# Patient Record
Sex: FEMALE | Race: WHITE | NOT HISPANIC OR LATINO | Employment: FULL TIME | URBAN - METROPOLITAN AREA
[De-identification: names, ages, dates, MRNs, and addresses within clinical notes are randomized per-mention and may not be internally consistent; named-entity substitution may affect disease eponyms.]

---

## 2019-01-15 ENCOUNTER — OFFICE VISIT (OUTPATIENT)
Dept: OBSTETRICS AND GYNECOLOGY | Facility: CLINIC | Age: 39
End: 2019-01-15
Attending: OBSTETRICS & GYNECOLOGY
Payer: COMMERCIAL

## 2019-01-15 VITALS
WEIGHT: 159.81 LBS | BODY MASS INDEX: 26.63 KG/M2 | HEIGHT: 65 IN | SYSTOLIC BLOOD PRESSURE: 140 MMHG | DIASTOLIC BLOOD PRESSURE: 100 MMHG

## 2019-01-15 DIAGNOSIS — N93.9 ABNORMAL UTERINE BLEEDING (AUB): Primary | ICD-10-CM

## 2019-01-15 DIAGNOSIS — Z31.9 PATIENT DESIRES PREGNANCY: ICD-10-CM

## 2019-01-15 PROCEDURE — 99999 PR PBB SHADOW E&M-NEW PATIENT-LVL III: ICD-10-PCS | Mod: PBBFAC,,, | Performed by: OBSTETRICS & GYNECOLOGY

## 2019-01-15 PROCEDURE — 3008F PR BODY MASS INDEX (BMI) DOCUMENTED: ICD-10-PCS | Mod: CPTII,S$GLB,, | Performed by: OBSTETRICS & GYNECOLOGY

## 2019-01-15 PROCEDURE — 3008F BODY MASS INDEX DOCD: CPT | Mod: CPTII,S$GLB,, | Performed by: OBSTETRICS & GYNECOLOGY

## 2019-01-15 PROCEDURE — 99203 PR OFFICE/OUTPT VISIT, NEW, LEVL III, 30-44 MIN: ICD-10-PCS | Mod: S$GLB,,, | Performed by: OBSTETRICS & GYNECOLOGY

## 2019-01-15 PROCEDURE — 99203 OFFICE O/P NEW LOW 30 MIN: CPT | Mod: S$GLB,,, | Performed by: OBSTETRICS & GYNECOLOGY

## 2019-01-15 PROCEDURE — 99999 PR PBB SHADOW E&M-NEW PATIENT-LVL III: CPT | Mod: PBBFAC,,, | Performed by: OBSTETRICS & GYNECOLOGY

## 2019-01-15 RX ORDER — UBIDECARENONE 30 MG
30 CAPSULE ORAL 3 TIMES DAILY
COMMUNITY

## 2019-01-15 RX ORDER — ZINC GLUCONATE 50 MG
50 TABLET ORAL DAILY
COMMUNITY

## 2019-01-15 NOTE — PROGRESS NOTES
CC: Abnormal bleeding, desires pregnancy    HPI:  Ruddy Saini is a 38 y.o. female patient  who presents today as a new patient to discuss her irregular bleeding and desire for pregnancy.  She has recently moved to [facility] from [place].  Previously, she had been on OCPs and then NuvaRing with light regular monthly menses.  Last year, she stopped the NuvaRing 2018 and developed persistent, prolonged bleeding for about 2 months until she reinserted another NuvaRing 10/2018.  For the past 2 months, she has had regular expected withdrawal bleeding with the NuvaRing.  She is currently on her period with moderate flow today.  She describes also having abnormal bleeding at other times when not using hormonal contraception.  Her mother has a history of uterine fibroids.  She is now the point that she is considering pregnancy.  Reports that her last pap was about 2 years ago in [place] - denies having abnormal paps in the past.    Patient's last menstrual period was 2018 (exact date).    History reviewed. No pertinent past medical history.    History reviewed. No pertinent surgical history.      Diagnosis:  1. Abnormal uterine bleeding (AUB)    2. Patient desires pregnancy          PLAN:    Orders Placed This Encounter    US Pelvis Comp with Transvag NON-OB (xpd    Follicle stimulating hormone    Luteinizing hormone    Prolactin    TSH    Antimullerian hormone (AMH)       Patient was counseled today on her abnormal bleeding which occurred several months ago while not on hormonal contraception.  We reviewed the various etiologies for this abnormal bleeding. She will begin the workup with pelvic ultrasound for evaluation of her uterus.  We also discussed checking hormone levels on cycle day 3 with her next period.  We discussed her desire for pregnancy at length, including AMA - risks.  We reviewed various genetic testing options.  We discussed strategies to maximize conception.  She  was encouraged to stop smoking.  To begin PNV.  She was encouraged to become established with a PCP for evaluation of her BP.    Follow-up after ultrasound / labs for evaluation / annual exam.    Total time of visit / counselin mintues

## 2019-01-23 ENCOUNTER — HOSPITAL ENCOUNTER (OUTPATIENT)
Dept: RADIOLOGY | Facility: OTHER | Age: 39
Discharge: HOME | End: 2019-01-23
Attending: OBSTETRICS & GYNECOLOGY
Payer: COMMERCIAL

## 2019-01-23 ENCOUNTER — TELEPHONE (OUTPATIENT)
Dept: OBSTETRICS AND GYNECOLOGY | Facility: CLINIC | Age: 39
End: 2019-01-23

## 2019-01-23 DIAGNOSIS — N93.9 ABNORMAL UTERINE BLEEDING (AUB): ICD-10-CM

## 2019-01-23 PROCEDURE — 76830 TRANSVAGINAL US NON-OB: CPT | Mod: TC

## 2019-01-23 PROCEDURE — 76830 TRANSVAGINAL US NON-OB: CPT | Mod: 26,,, | Performed by: RADIOLOGY

## 2019-01-23 PROCEDURE — 76830 US PELVIS COMP WITH TRANSVAG NON-OB (XPD): ICD-10-PCS | Mod: 26,,, | Performed by: RADIOLOGY

## 2019-01-23 PROCEDURE — 76856 US EXAM PELVIC COMPLETE: CPT | Mod: 26,,, | Performed by: RADIOLOGY

## 2019-01-23 PROCEDURE — 76856 US PELVIS COMP WITH TRANSVAG NON-OB (XPD): ICD-10-PCS | Mod: 26,,, | Performed by: RADIOLOGY

## 2019-01-24 NOTE — TELEPHONE ENCOUNTER
----- Message from Barb Madrigal sent at 1/24/2019  2:31 PM CST -----  Contact: self  Pt is returning a phone call. The pt can be reached at [phone].

## 2019-01-25 ENCOUNTER — TELEPHONE (OUTPATIENT)
Dept: OBSTETRICS AND GYNECOLOGY | Facility: CLINIC | Age: 39
End: 2019-01-25

## 2019-01-25 NOTE — TELEPHONE ENCOUNTER
----- Message from Trae Linares sent at 1/25/2019 12:30 PM CST -----  Contact: RUDDY MORAN [[id number]]  Name of Who is Calling: RUDDY MORAN [[id number]]      What is the request in detail: Returning Dr. Yousif call.       Can the clinic reply by [facility]: no      [facility]: [phone]

## 2019-01-25 NOTE — TELEPHONE ENCOUNTER
Called patient:    Discussed results of pelvic sono:    FINDINGS:  Uterus:  Size: 7.9 x 4.4 x 5.4 cm  Masses: 2 small uterine fibroids measuring 1.5 x 1.0 x 1.5 cm and 1.1 x 0.9 x 1.3 cm.  Endometrium: Normal in this pre menopausal patient, measuring 7 mm.  Right ovary:  Size: 2.0 x 1.4 x 2.2 cm  Appearance: Normal  Vascular flow: Normal.  Left ovary:  Size: 3.6 x 2.4 x 2.4 cm  Appearance: Normal  Vascular Flow: Normal.  Free Fluid:  None.      Impression     Two small uterine fibroids.  Otherwise no significant sonographic abnormality.     We reviewed the ultrasound findings and discussed fibroids.  1.5 cm fibroids are so small that I do not think they are the etiology for her prior abnormal bleeding. She will contact us with the beginning of her menses to schedule labs to evaluate her hormones.

## 2019-02-07 ENCOUNTER — TELEPHONE (OUTPATIENT)
Dept: OBSTETRICS AND GYNECOLOGY | Facility: CLINIC | Age: 39
End: 2019-02-07

## 2019-02-07 NOTE — TELEPHONE ENCOUNTER
Message   Received: Today   Message Contents   JASPAL Echavarria Staff   Caller: Unspecified (Today, 10:12 AM)             Pt call and want to schedule her check up and lab. Pt states she was told to call and ask for nurse to set up her visit. Pt can be reached at [phone]. Thanks

## 2019-02-07 NOTE — TELEPHONE ENCOUNTER
----- Message from Matilda Ahumada MA sent at 2/7/2019 10:12 AM CST -----  Pt call and want to schedule her check up and lab. Pt states she was told to call and ask for nurse to set up her visit. Pt can be reached at [phone]. Thanks

## 2019-02-11 ENCOUNTER — LAB VISIT (OUTPATIENT)
Dept: LAB | Facility: HOSPITAL | Age: 39
End: 2019-02-11
Attending: OBSTETRICS & GYNECOLOGY
Payer: COMMERCIAL

## 2019-02-11 DIAGNOSIS — N93.9 ABNORMAL UTERINE BLEEDING (AUB): ICD-10-CM

## 2019-02-11 LAB
FSH SERPL-ACNC: 24 MIU/ML
LH SERPL-ACNC: 11.1 MIU/ML
PROLACTIN SERPL IA-MCNC: 28.1 NG/ML
TSH SERPL DL<=0.005 MIU/L-ACNC: 1.5 UIU/ML

## 2019-02-11 PROCEDURE — 83520 IMMUNOASSAY QUANT NOS NONAB: CPT

## 2019-02-11 PROCEDURE — 84443 ASSAY THYROID STIM HORMONE: CPT

## 2019-02-11 PROCEDURE — 36415 COLL VENOUS BLD VENIPUNCTURE: CPT

## 2019-02-11 PROCEDURE — 84146 ASSAY OF PROLACTIN: CPT

## 2019-02-11 PROCEDURE — 83001 ASSAY OF GONADOTROPIN (FSH): CPT

## 2019-02-11 PROCEDURE — 83002 ASSAY OF GONADOTROPIN (LH): CPT

## 2019-02-12 ENCOUNTER — TELEPHONE (OUTPATIENT)
Dept: OBSTETRICS AND GYNECOLOGY | Facility: CLINIC | Age: 39
End: 2019-02-12

## 2019-02-12 LAB — MIS SERPL-MCNC: <0.1 NG/ML (ref 0.9–9.5)

## 2019-02-12 NOTE — TELEPHONE ENCOUNTER
Called patient:    Message left to call us.    [FSH upper limits of normal, prolactin elevated - to see endocrinology]

## 2019-02-13 NOTE — TELEPHONE ENCOUNTER
----- Message from Tete Herrera sent at 2/13/2019  1:01 PM CST -----  Contact: alexandre  Name of Who is Calling: alexandre      What is the request in detail: Patient was returning a missed call back from the staff       Can the clinic reply by [facility]: no      [facility]: [phone]

## 2019-02-14 NOTE — TELEPHONE ENCOUNTER
Called patient:    Discussed results of labs:    TSH 1.4,  Prolactin 28.1  FSH 24,  LH 11.1  AMH <.1    We discussed her elevated FSH/LH as well as decreased AMH / ovarian reserve and mildly elevated prolactin.    With her desire for pregnancy and these hormone findings, we discussed the recommendation for DEXTER - given names.

## 2019-08-20 ENCOUNTER — PATIENT MESSAGE (OUTPATIENT)
Dept: OBSTETRICS AND GYNECOLOGY | Facility: CLINIC | Age: 39
End: 2019-08-20

## 2019-08-20 ENCOUNTER — OFFICE VISIT (OUTPATIENT)
Dept: INTERNAL MEDICINE | Facility: CLINIC | Age: 39
End: 2019-08-20
Payer: COMMERCIAL

## 2019-08-20 VITALS
SYSTOLIC BLOOD PRESSURE: 116 MMHG | OXYGEN SATURATION: 99 % | DIASTOLIC BLOOD PRESSURE: 82 MMHG | HEART RATE: 81 BPM | HEIGHT: 65 IN | WEIGHT: 156.63 LBS | BODY MASS INDEX: 26.1 KG/M2

## 2019-08-20 DIAGNOSIS — N94.6 DYSMENORRHEA: ICD-10-CM

## 2019-08-20 DIAGNOSIS — E55.9 VITAMIN D DEFICIENCY: ICD-10-CM

## 2019-08-20 DIAGNOSIS — M79.10 MYALGIA: ICD-10-CM

## 2019-08-20 DIAGNOSIS — Z13.1 SCREENING FOR DIABETES MELLITUS: ICD-10-CM

## 2019-08-20 DIAGNOSIS — Z13.220 SCREENING FOR LIPID DISORDERS: ICD-10-CM

## 2019-08-20 DIAGNOSIS — Z00.00 ANNUAL PHYSICAL EXAM: Primary | ICD-10-CM

## 2019-08-20 PROBLEM — N20.0 RECURRENT NEPHROLITHIASIS: Status: ACTIVE | Noted: 2019-08-20

## 2019-08-20 PROCEDURE — 99385 PR PREVENTIVE VISIT,NEW,18-39: ICD-10-PCS | Mod: S$GLB,,, | Performed by: FAMILY MEDICINE

## 2019-08-20 PROCEDURE — 99385 PREV VISIT NEW AGE 18-39: CPT | Mod: S$GLB,,, | Performed by: FAMILY MEDICINE

## 2019-08-20 PROCEDURE — 99999 PR PBB SHADOW E&M-EST. PATIENT-LVL IV: ICD-10-PCS | Mod: PBBFAC,,, | Performed by: FAMILY MEDICINE

## 2019-08-20 PROCEDURE — 99999 PR PBB SHADOW E&M-EST. PATIENT-LVL IV: CPT | Mod: PBBFAC,,, | Performed by: FAMILY MEDICINE

## 2019-08-20 RX ORDER — METAXALONE 800 MG/1
800 TABLET ORAL 3 TIMES DAILY PRN
Qty: 30 TABLET | Refills: 1 | Status: SHIPPED | OUTPATIENT
Start: 2019-08-20

## 2019-08-20 RX ORDER — AMOXICILLIN 500 MG
CAPSULE ORAL DAILY
COMMUNITY

## 2019-08-20 RX ORDER — METAXALONE 800 MG/1
800 TABLET ORAL 3 TIMES DAILY PRN
COMMUNITY
End: 2019-08-20 | Stop reason: SDUPTHER

## 2019-08-20 NOTE — PATIENT INSTRUCTIONS
Prevention Guidelines, Women Ages 18 to 39  Screening tests and vaccines are an important part of managing your health. Health counseling is essential, too. Below are guidelines for these, for women ages 18 to 39. Talk with your healthcare provider to make sure youre up-to-date on what you need.  Screening Who needs it How often   Alcohol misuse All women in this age group At routine exams   Blood pressure All women in this age group Every 2 years if your blood pressure is less than 120/80 mm Hg; yearly if your systolic blood pressure is 120 to 139 mm Hg, or your diastolic blood pressure reading is 80 to 89 mm Hg   Breast cancer All women in this age group should talk with their healthcare providers about the need for clinical breast exams (CBE)1 Clinical breast exam every 3 years1   Cervical cancer Women ages 21 and older Women between ages 21 and 29 should have a Pap test every 3 years; women between ages 30 and 65 are advised to have a Pap test plus an HPV test every 5 years   Chlamydia Sexually active women ages 24 and younger, and women at increased risk for infection Every 3 years if you're at risk or have symptoms   Depression All women in this age group At routine exams   Diabetes mellitus, type 2 Adults with no symptoms who are overweight or obese and have 1 or more other risk factors for diabetes At least every 3 years   Gonorrhea Sexually active women at increased risk for infection At routine exams   Hepatitis C Anyone at increased risk At routine exams   HIV All women At routine exams   Obesity All women in this age group At routine exams   Syphilis Women at increased risk for infection - talk with your healthcare provider At routine exams   Tuberculosis Women at increased risk for infection - talk with your healthcare provider Ask your healthcare provider   Vision All women in this age group At least 1 complete exam in your 20s, and 2 in your 30s   Vaccine2 Who needs it How often   Chickenpox  (varicella) All women in this age group who have no record of this infection or vaccine 2 doses; the second dose should be given 4 to 8 weeks after the first dose   Hepatitis A Women at increased risk for infection - talk with your healthcare provider 2 doses given at least 6 months apart   Hepatitis B Women at increased risk for infection - talk with your healthcare provider 3 doses over 6 months; second dose should be given 1 month after the first dose; the third dose should be given at least 2 months after the second dose and at least 4 months after the first dose   Haemophilus influenzae Type B (HIB) Women at increased risk for infection - talk with your healthcare provider 1 to 3 doses   Human papillomavirus (HPV) All women in this age group up to age 26 3 doses; the second dose should be given 1 to 2 months after the first dose and the third dose given 6 months after the first dose   Influenza (flu) All women in this age group Once a year   Measles, mumps, rubella (MMR) All women in this age group who have no record of these infections or vaccines 1 or 2 doses   Meningococcal Women at increased risk for infection - talk with your healthcare provider 1 or more doses   Pneumococcal conjugate vaccine (PCV13) and pneumococcal polysaccharide vaccine (PPSV23) Women at increased risk for infection - talk with your healthcare provider PCV13: 1 dose ages 19 to 65 (protects against 13 types of pneumococcal bacteria)  PPSV23: 1 to 2 doses through age 64, or 1 dose at 65 or older (protects against 23 types of pneumococcal bacteria)      Tetanus/diphtheria/pertussis (Td/Tdap) booster All women in this age group Td every 10 years, or a one-time dose of Tdap instead of a Td booster after age 18, then Td every 10 years   Counseling Who needs it How often   BRCA gene mutation testing for breast and ovarian cancer susceptibility Women with increased risk for having gene mutation When your risk is known   Breast cancer and  chemoprevention Women at high risk for breast cancer When your risk is known   Diet and exercise Women who are overweight or obese When diagnosed, and then at routine exams   Domestic violence Women at the age in which they are able to have children At routine exams   Sexually transmitted infection prevention Women who are sexually active At routine exams   Skin cancer Prevention of skin cancer in fair-skinned adults through age 24 At routine exams   Use of tobacco and the health effects it can cause All women in this age group Every visit   1According to the ACS, women ages 20 to 39 years should have a clinical breast exam (CBE) as part of their routine health exam every 3 years. Breast self-exams are an option for women starting in their 20s. But the  USPSTF does not recommend CBE.  2Those who are 18 years old and not up-to-date on their childhood vaccines should get all appropriate catch-up vaccines recommended by the CDC.  Date Last Reviewed: 8/27/2015  © 7417-2674 [employer]. [address] [place] [address], [place] [address]. All rights reserved. This information is not intended as a substitute for professional medical care. Always follow your healthcare professional's instructions.

## 2019-08-20 NOTE — PROGRESS NOTES
[facility]  [facility]  Clinic Note      Subjective:       Patient ID: Ruddy Saini is a 39 y.o. female.    Chief Complaint: Back Pain    New patient is here today to establish care and for wellness exam.  She notes 4-5 days onset of headache, migraine versus tension, and diffuse muscle pains, cramping and tension.  She notes the pain was initially 7-8/10 pain, now down to 2/10 after taking Excedrin and ibuprofen.  She notes prior episodes of lower back pain, but this was different, more diffuse, more like cramping.  In the past she had been taking Skelaxin for back and muscle pain, but had not tried it during this episode.  She describes possible flu in July, with episodic nasal symptoms, also possibly related to seasonal allergies and patient is not sure.  She has not had any fever or sick contacts.  She describes the pain and muscle spasms in the lower back muscles, as well as bilateral hamstrings, calves, elbows, and forearms.  No radicular pain or radiation, no paresthesias.  She has had some neck muscle tension, which is more chronic/recurrent.  She describes some salivary gland enlargement and spasms in this timeframe, and the overlying fatigue.  Otherwise without any concerns or complaints today.  Cervical cancer screening was completed last year, is now followed by Dr. Yousif since moving from [place] last summer.  She has no breast concerns, no prior need for mammogram.  She is a .    Past medical, surgical, family, social history reviewed.      Review of Systems   Constitutional: Positive for fatigue. Negative for fever.   HENT: Positive for rhinorrhea and sinus pain. Negative for congestion, sinus pressure and sore throat.    Respiratory: Negative for cough and shortness of breath.    Cardiovascular: Negative for chest pain and palpitations.   Gastrointestinal: Negative for diarrhea, nausea and vomiting.   Genitourinary: Positive  "for menstrual problem (irregular cycles, is trying to become pregnant). Negative for dysuria.   Musculoskeletal: Positive for back pain, myalgias and neck pain. Negative for arthralgias and neck stiffness.   Skin: Negative for rash and wound.   Neurological: Negative for dizziness, syncope, weakness and headaches.   Psychiatric/Behavioral: Negative for sleep disturbance (except as due to pain). The patient is not nervous/anxious.        Objective:      /82 (BP Location: Right arm, Patient Position: Sitting, BP Method: Large (Manual))   Pulse 81   Ht 5' 5" (1.651 m)   Wt 71.1 kg (156 lb 10.2 oz)   LMP 07/01/2019 (Approximate)   SpO2 99%   BMI 26.07 kg/m²   Physical Exam   Constitutional: She is oriented to person, place, and time. She appears well-developed and well-nourished. No distress.   HENT:   Head: Normocephalic and atraumatic.   Right Ear: Tympanic membrane and ear canal normal. Tympanic membrane is not erythematous and not retracted. No middle ear effusion.   Left Ear: Tympanic membrane and ear canal normal. Tympanic membrane is not erythematous and not retracted.  No middle ear effusion.   Nose: Rhinorrhea present. No mucosal edema. Right sinus exhibits no maxillary sinus tenderness and no frontal sinus tenderness. Left sinus exhibits no maxillary sinus tenderness and no frontal sinus tenderness.   Mouth/Throat: Mucous membranes are normal. Posterior oropharyngeal erythema (mild post-nasal drip) present. No posterior oropharyngeal edema.   Neck: Normal range of motion. No thyromegaly present.   Cardiovascular: Normal rate and regular rhythm.   No murmur heard.  Pulmonary/Chest: Effort normal and breath sounds normal. No respiratory distress. She has no wheezes. She has no rales.   Abdominal: Soft. Bowel sounds are normal. She exhibits no distension. There is no tenderness.   Musculoskeletal: Normal range of motion. She exhibits no edema.        Cervical back: She exhibits pain and spasm. She " exhibits normal range of motion, no tenderness, no bony tenderness and no deformity.        Lumbar back: She exhibits pain and spasm. She exhibits normal range of motion, no tenderness, no bony tenderness and no deformity.   Lymphadenopathy:     She has cervical adenopathy (mild submandibular, mild tender).   Neurological: She is alert and oriented to person, place, and time. No cranial nerve deficit or sensory deficit. She exhibits normal muscle tone.   Skin: Skin is warm and dry. No rash noted.   Psychiatric: She has a normal mood and affect.   Vitals reviewed.      Assessment:       1. Annual physical exam    2. Screening for lipid disorders    3. Screening for diabetes mellitus    4. Dysmenorrhea    5. Vitamin D deficiency    6. Myalgia        Plan:     1. Annual physical exam  2. Screening for lipid disorders  3. Screening for diabetes mellitus  4. Dysmenorrhea  5. Vitamin D deficiency  - health history reviewed, USPSTF preventive health recommendations based on age reviewed and tests ordered below, will request records from [place] to update health maintenance information  - - Lipid panel; Future  - - Hemoglobin A1c; Future  - - Comprehensive metabolic panel; Future  - - CBC auto differential; Future  - routine health maintenance counseling provided and healthy diet/exercise recommendations reviewed  - adult immunization counseling reviewed     6. Myalgia  - history and exam findings reviewed, generally reassuring and clinical course has improved in the last 2-3 days  - differential reviewed, presentation is most suggestive of viral syndrome given constellation of symptoms and diffuse nature, no alarm signs or symptoms  - supportive care measures reviewed, topical remedies and trial of physical therapy recommended for the recurrent neck/back and muscle pain, patient is most interested in dry needling as an option as well  - - Ambulatory Referral to Physical/Occupational Therapy   - treatment options  reviewed, would be reasonable to continue OTC NSAID use judiciously, will offer refill of muscle relaxant for intermittent as-needed use, dosing instructions and potential side effects reviewed  - - metaxalone (SKELAXIN) 800 MG tablet; Take 1 tablet (800 mg total) by mouth 3 (three) times daily as needed for Pain.  Dispense: 30 tablet; Refill: 1  - questions answered, warning signs reviewed, patient is comfortable with this plan and was encouraged to call the office for any concerns or worsening of condition     - Follow up in about 1 year (around 8/20/2020) for annual exam.     Trey Read MD  8/20/2019          Medication List with Changes/Refills   Current Medications    CO-ENZYME Q-10 30 MG CAPSULE    Take 30 mg by mouth 3 (three) times daily.    FISH OIL-OMEGA-3 FATTY ACIDS 300-1,000 MG CAPSULE    Take by mouth once daily.    ZINC GLUCONATE 50 MG TABLET    Take 50 mg by mouth once daily.   Changed and/or Refilled Medications    Modified Medication Previous Medication    METAXALONE (SKELAXIN) 800 MG TABLET metaxalone (SKELAXIN) 800 MG tablet       Take 1 tablet (800 mg total) by mouth 3 (three) times daily as needed for Pain.    Take 800 mg by mouth 3 (three) times daily as needed for Pain.

## 2019-08-21 ENCOUNTER — LAB VISIT (OUTPATIENT)
Dept: LAB | Facility: HOSPITAL | Age: 39
End: 2019-08-21
Attending: FAMILY MEDICINE
Payer: COMMERCIAL

## 2019-08-21 DIAGNOSIS — E55.9 VITAMIN D DEFICIENCY: ICD-10-CM

## 2019-08-21 DIAGNOSIS — Z00.00 ANNUAL PHYSICAL EXAM: ICD-10-CM

## 2019-08-21 DIAGNOSIS — N94.6 DYSMENORRHEA: ICD-10-CM

## 2019-08-21 DIAGNOSIS — Z13.1 SCREENING FOR DIABETES MELLITUS: ICD-10-CM

## 2019-08-21 DIAGNOSIS — Z13.220 SCREENING FOR LIPID DISORDERS: ICD-10-CM

## 2019-08-21 LAB
25(OH)D3+25(OH)D2 SERPL-MCNC: 32 NG/ML (ref 30–96)
ALBUMIN SERPL BCP-MCNC: 4.3 G/DL (ref 3.5–5.2)
ALP SERPL-CCNC: 45 U/L (ref 55–135)
ALT SERPL W/O P-5'-P-CCNC: 12 U/L (ref 10–44)
ANION GAP SERPL CALC-SCNC: 8 MMOL/L (ref 8–16)
AST SERPL-CCNC: 11 U/L (ref 10–40)
BASOPHILS # BLD AUTO: 0.02 K/UL (ref 0–0.2)
BASOPHILS NFR BLD: 0.4 % (ref 0–1.9)
BILIRUB SERPL-MCNC: 0.7 MG/DL (ref 0.1–1)
BUN SERPL-MCNC: 13 MG/DL (ref 6–20)
CALCIUM SERPL-MCNC: 10 MG/DL (ref 8.7–10.5)
CHLORIDE SERPL-SCNC: 103 MMOL/L (ref 95–110)
CHOLEST SERPL-MCNC: 126 MG/DL (ref 120–199)
CHOLEST/HDLC SERPL: 2.3 {RATIO} (ref 2–5)
CO2 SERPL-SCNC: 27 MMOL/L (ref 23–29)
CREAT SERPL-MCNC: 0.8 MG/DL (ref 0.5–1.4)
DIFFERENTIAL METHOD: ABNORMAL
EOSINOPHIL # BLD AUTO: 0.1 K/UL (ref 0–0.5)
EOSINOPHIL NFR BLD: 3 % (ref 0–8)
ERYTHROCYTE [DISTWIDTH] IN BLOOD BY AUTOMATED COUNT: 12 % (ref 11.5–14.5)
EST. GFR  (AFRICAN AMERICAN): >60 ML/MIN/1.73 M^2
EST. GFR  (NON AFRICAN AMERICAN): >60 ML/MIN/1.73 M^2
ESTIMATED AVG GLUCOSE: 100 MG/DL (ref 68–131)
GLUCOSE SERPL-MCNC: 97 MG/DL (ref 70–110)
HBA1C MFR BLD HPLC: 5.1 % (ref 4–5.6)
HCT VFR BLD AUTO: 43.4 % (ref 37–48.5)
HDLC SERPL-MCNC: 56 MG/DL (ref 40–75)
HDLC SERPL: 44.4 % (ref 20–50)
HGB BLD-MCNC: 13.7 G/DL (ref 12–16)
IMM GRANULOCYTES # BLD AUTO: 0.01 K/UL (ref 0–0.04)
IMM GRANULOCYTES NFR BLD AUTO: 0.2 % (ref 0–0.5)
LDLC SERPL CALC-MCNC: 58.4 MG/DL (ref 63–159)
LYMPHOCYTES # BLD AUTO: 0.9 K/UL (ref 1–4.8)
LYMPHOCYTES NFR BLD: 19.1 % (ref 18–48)
MCH RBC QN AUTO: 30.9 PG (ref 27–31)
MCHC RBC AUTO-ENTMCNC: 31.6 G/DL (ref 32–36)
MCV RBC AUTO: 98 FL (ref 82–98)
MONOCYTES # BLD AUTO: 0.5 K/UL (ref 0.3–1)
MONOCYTES NFR BLD: 10.5 % (ref 4–15)
NEUTROPHILS # BLD AUTO: 3.1 K/UL (ref 1.8–7.7)
NEUTROPHILS NFR BLD: 66.8 % (ref 38–73)
NONHDLC SERPL-MCNC: 70 MG/DL
NRBC BLD-RTO: 0 /100 WBC
PLATELET # BLD AUTO: 227 K/UL (ref 150–350)
PMV BLD AUTO: 11.5 FL (ref 9.2–12.9)
POTASSIUM SERPL-SCNC: 4.8 MMOL/L (ref 3.5–5.1)
PROT SERPL-MCNC: 8.2 G/DL (ref 6–8.4)
RBC # BLD AUTO: 4.43 M/UL (ref 4–5.4)
SODIUM SERPL-SCNC: 138 MMOL/L (ref 136–145)
TRIGL SERPL-MCNC: 58 MG/DL (ref 30–150)
WBC # BLD AUTO: 4.66 K/UL (ref 3.9–12.7)

## 2019-08-21 PROCEDURE — 36415 COLL VENOUS BLD VENIPUNCTURE: CPT | Mod: PN

## 2019-08-21 PROCEDURE — 80053 COMPREHEN METABOLIC PANEL: CPT

## 2019-08-21 PROCEDURE — 80061 LIPID PANEL: CPT

## 2019-08-21 PROCEDURE — 83036 HEMOGLOBIN GLYCOSYLATED A1C: CPT

## 2019-08-21 PROCEDURE — 82306 VITAMIN D 25 HYDROXY: CPT

## 2019-08-21 PROCEDURE — 85025 COMPLETE CBC W/AUTO DIFF WBC: CPT

## 2019-08-22 ENCOUNTER — TELEPHONE (OUTPATIENT)
Dept: INTERNAL MEDICINE | Facility: CLINIC | Age: 39
End: 2019-08-22

## 2019-08-22 NOTE — TELEPHONE ENCOUNTER
"MyChart message sent.  Please let the patient know their results, thank you:    " Hi Ms. Saini,    I have reviewed your recent lab results, and things are looking good.  Your blood counts are all within acceptable limits and do not show any anemia.  Your vitamin D level is in the normal range.  Your electrolytes, blood sugar, and kidney/liver function are all normal.  Your screening for diabetes is negative, and your cholesterol levels are all excellent.  Keep up the good work!  Please call the office if you have any additional questions or concerns.    Trey Read MD      "

## 2019-08-28 ENCOUNTER — OFFICE VISIT (OUTPATIENT)
Dept: OBSTETRICS AND GYNECOLOGY | Facility: CLINIC | Age: 39
End: 2019-08-28
Attending: OBSTETRICS & GYNECOLOGY
Payer: COMMERCIAL

## 2019-08-28 ENCOUNTER — TELEPHONE (OUTPATIENT)
Dept: INTERNAL MEDICINE | Facility: CLINIC | Age: 39
End: 2019-08-28

## 2019-08-28 VITALS
BODY MASS INDEX: 26.19 KG/M2 | SYSTOLIC BLOOD PRESSURE: 134 MMHG | WEIGHT: 157.44 LBS | DIASTOLIC BLOOD PRESSURE: 84 MMHG

## 2019-08-28 DIAGNOSIS — N92.6 IRREGULAR MENSES: ICD-10-CM

## 2019-08-28 DIAGNOSIS — Z01.419 WELL WOMAN EXAM WITH ROUTINE GYNECOLOGICAL EXAM: Primary | ICD-10-CM

## 2019-08-28 DIAGNOSIS — Z12.4 PAP SMEAR FOR CERVICAL CANCER SCREENING: ICD-10-CM

## 2019-08-28 PROCEDURE — 87624 HPV HI-RISK TYP POOLED RSLT: CPT

## 2019-08-28 PROCEDURE — 99395 PR PREVENTIVE VISIT,EST,18-39: ICD-10-PCS | Mod: S$GLB,,, | Performed by: OBSTETRICS & GYNECOLOGY

## 2019-08-28 PROCEDURE — 99999 PR PBB SHADOW E&M-EST. PATIENT-LVL II: CPT | Mod: PBBFAC,,, | Performed by: OBSTETRICS & GYNECOLOGY

## 2019-08-28 PROCEDURE — 99395 PREV VISIT EST AGE 18-39: CPT | Mod: S$GLB,,, | Performed by: OBSTETRICS & GYNECOLOGY

## 2019-08-28 PROCEDURE — 99999 PR PBB SHADOW E&M-EST. PATIENT-LVL II: ICD-10-PCS | Mod: PBBFAC,,, | Performed by: OBSTETRICS & GYNECOLOGY

## 2019-08-28 PROCEDURE — 88175 CYTOPATH C/V AUTO FLUID REDO: CPT

## 2019-08-28 NOTE — PROGRESS NOTES
Ruddy Saini is a 39 y.o. female  who presents for annual exam.  She has been off of hormonal contraception since 2019, attempting conception.  For the first four months of this year, she had regular monthly menses, lasting 5-7 days.  However, she did not have a period May or .  In July, ovulation predictor kits were consistent with ovulation and she noted some spotting.  Recently, she has had additional spotting and feels that her period just started today.    Patient's last menstrual period was 2019 (approximate).     UPT: Negative      Pelvic sono 19  FINDINGS:  Uterus:  Size: 7.9 x 4.4 x 5.4 cm  Masses: 2 small uterine fibroids measuring 1.5 x 1.0 x 1.5 cm and 1.1 x 0.9 x 1.3 cm.  Endometrium: Normal in this pre menopausal patient, measuring 7 mm.  Right ovary:  Size: 2.0 x 1.4 x 2.2 cm  Appearance: Normal  Vascular flow: Normal.  Left ovary:  Size: 3.6 x 2.4 x 2.4 cm  Appearance: Normal  Vascular Flow: Normal.  Free Fluid:  None.       Impression       Two small uterine fibroids.  Otherwise no significant sonographic abnormality.       19  TSH 1.4,  Prolactin 28.1  FSH 24,  LH 11.1  AMH <.1    Past Medical History:   Diagnosis Date    Recurrent nephrolithiasis 2019       History reviewed. No pertinent surgical history.    OB History        1    Para        Term                AB   1    Living   0       SAB        TAB        Ectopic        Multiple        Live Births                     ROS:  GENERAL: Feeling well overall.   SKIN: Denies rash or lesions.   HEAD: Denies head injury or headache.   NODES: Denies enlarged lymph nodes.   CHEST: Denies chest pain or shortness of breath.   CARDIOVASCULAR: Denies palpitations or left sided chest pain.   ABDOMEN: No abdominal pain, nausea, vomiting or rectal bleeding.   URINARY: No dysuria or hematuria.  REPRODUCTIVE: See HPI.   BREASTS: Denies pain, lumps, or nipple discharge.   HEMATOLOGIC: No easy bruisability  or excessive bleeding.   MUSCULOSKELETAL: Denies joint pain or swelling.   NEUROLOGIC: Denies syncope or weakness.   PSYCHIATRIC: Denies depression.    PE:   (chaperone present during entire exam)  APPEARANCE: Well nourished, well developed, in no acute distress.  BREASTS: Symmetrical, no skin changes or visible lesions. No palpable masses, nipple discharge or adenopathy bilaterally.  ABDOMEN: Soft. No tenderness or masses. No hernias. No CVA tenderness.  VULVA: No lesions. Normal female genitalia.  URETHRAL MEATUS: Normal size and location, no lesions, no prolapse.  URETHRA: No masses, tenderness, prolapse or scarring.  VAGINA: Moist and well rugated, mild amount of menstrual blood in vault.  CERVIX: No lesions and discharge. PAP done.  UTERUS: Normal size, regular shape, mobile, non-tender, bladder base nontender.  ADNEXA: No masses, tenderness or CDS nodularity.  ANUS PERINEUM: Normal.      Diagnosis:  1. Well woman exam with routine gynecological exam    2. Pap smear for cervical cancer screening    3. Irregular menses          PLAN:    Orders Placed This Encounter    HPV High Risk Genotypes, PCR    Liquid-based pap smear, screening       Patient was counseled today on the need for annual gyn exams.  We discussed her recent abnormal bleeding and the various etiologies.  With her desire for pregnancy at age 39 with her irregular menses, and above hormone levels, we discussed the recommendation for referral to DEXTER - kimmy names.      Follow-up in 1 year.

## 2019-08-29 NOTE — TELEPHONE ENCOUNTER
Records reviewed, health maintenance record updated, please send records for scanning into chart, thank you.

## 2019-09-04 LAB
HPV HR 12 DNA CVX QL NAA+PROBE: NEGATIVE
HPV16 AG SPEC QL: NEGATIVE
HPV18 DNA SPEC QL NAA+PROBE: NEGATIVE

## 2019-09-05 ENCOUNTER — TELEPHONE (OUTPATIENT)
Dept: INTERNAL MEDICINE | Facility: CLINIC | Age: 39
End: 2019-09-05

## 2019-09-05 ENCOUNTER — PATIENT MESSAGE (OUTPATIENT)
Dept: OBSTETRICS AND GYNECOLOGY | Facility: CLINIC | Age: 39
End: 2019-09-05

## 2019-09-16 ENCOUNTER — CLINICAL SUPPORT (OUTPATIENT)
Dept: REHABILITATION | Facility: OTHER | Age: 39
End: 2019-09-16
Attending: FAMILY MEDICINE
Payer: COMMERCIAL

## 2019-09-16 DIAGNOSIS — M54.9 CHRONIC NECK AND BACK PAIN: Primary | ICD-10-CM

## 2019-09-16 DIAGNOSIS — M62.81 MUSCLE WEAKNESS OF UPPER EXTREMITY: ICD-10-CM

## 2019-09-16 DIAGNOSIS — G89.29 CHRONIC NECK AND BACK PAIN: Primary | ICD-10-CM

## 2019-09-16 DIAGNOSIS — M54.2 CHRONIC NECK AND BACK PAIN: Primary | ICD-10-CM

## 2019-09-16 DIAGNOSIS — M54.50 CHRONIC BILATERAL LOW BACK PAIN WITHOUT SCIATICA: ICD-10-CM

## 2019-09-16 DIAGNOSIS — G89.29 CHRONIC BILATERAL LOW BACK PAIN WITHOUT SCIATICA: ICD-10-CM

## 2019-09-16 DIAGNOSIS — R29.3 POOR POSTURE: ICD-10-CM

## 2019-09-16 PROCEDURE — 97161 PT EVAL LOW COMPLEX 20 MIN: CPT | Mod: PN

## 2019-09-16 PROCEDURE — 97140 MANUAL THERAPY 1/> REGIONS: CPT | Mod: PN

## 2019-09-16 NOTE — PLAN OF CARE
"[facility]  Physical Therapy Initial Evaluation    Name: Ruddy Saini  Clinic Number: [id number]    Therapy Diagnosis:   Encounter Diagnoses   Name Primary?    Chronic neck and back pain Yes    Chronic bilateral low back pain without sciatica     Poor posture     Muscle weakness of upper extremity      Physician: Trey Read MD    Physician Orders: PT Eval and Treat dry needling  Medical Diagnosis: M79.10 (ICD-10-CM) - Myalgia  Evaluation Date: 9/16/2019  Authorization Period Expiration: 8/19/2020  Plan of Care Certification Period: 11/11/19  Visit # / Visits authorized: 1 (based on medical necessity)    Time In: 4:07 PM  Time Out: 5:26 PM  Total Billable Time: 60 minutes    Precautions: Standard      Subjective     History of current condition - Deana is a 38 yo F referred to OP PT secondary low back and neck pain. States her low back pain is not very frequent. States once a year over the past 2 years that her low back would "freeze up" and she would rest and work on ROM. States she has been having tension HAs for 10 years. 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 When the pain subsided, her body felt like she has "Charley horse -type pain" for a week. Feels like she is back to her regular range of motion.        Past Medical History:   Diagnosis Date    Recurrent nephrolithiasis 8/20/2019     Ruddy Saini  has no past surgical history on file.    Ruddy has a current medication list which includes the following prescription(s): co-enzyme q-10, fish oil-omega-3 fatty acids, metaxalone, and zinc gluconate.    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   Bowel and Bladder incontinence: none    Sleep is not disturbed. Sleeping position: side, back    Patients structured exercise routine: yoga  Exercise routine prior to onset: yoga, swimming    Pts goals: less discomfort, more flexibility, better circulation    Objective     Postural examination in standing:  - normal lumbar lordosis  - forward head  - forward shoulders    Postural examination in sitting:   - normal lumbar lordosis  - forward head  - forward shoulders      Functional assessment:   - walking: I  - sit to stand: I  - sit to supine: I        - supine to sit: I  - supine to prone:  I    Cervical AROM    flexion 20 deg   extension 35 deg   R rotation 60 deg   L rotation 60 deg   R side bending 30 deg   L side bending 30 deg       Cervical Special Tests    Compression negative   Alar Ligament Stress Test: negative   Sharp-Landon Test negative   Spurling's:    Positive        UE MMT R L   C3 Cervical side bend 5/5 5/5   C4 Shoulder Shrug 5/5  5/5   Shoulder flexion 5/5 5/5   Shoulder abduction 5/5 5/5   Shoulder IR 5/5 5/5   Shoulder ER 4/5 4/5   C5 Elbow flexion 5/5 5/5   C7 Elbow extension 5/5 5/5   C6 Wrist extension 5/5 5/5   Wrist flexion 5/5 5/5   Scapular protraction 5/5 5/5   Mid Traps 3+/5 3+/5   Lower traps 3/5 3/5     Lumbar active range of motion in standing is:  Flexion 90 deg   Extension 40 deg   Left side bending 30 deg   Right side bending 35 deg   Left rotation Decreased 25%   Right rotation Decreased 25%      Lumbar Special tests    SLR negative   Cross SLR negative   THERESE negative   Prone Instability Test negative       MMT R L   Hip flexion 5/5 5/5   Hip abduction 5/5 4+/5   Hip extension 5/5 5/5   Hip ER 5/5 5/5   Hip IR 5/5 5/5   Knee extension 5/5 5/5   Knee flexion 5/5 5/5   Ankle dorsiflexion 5/5 5/5   Ankle plantar flexion 5/5 5/5     Flexibility testing:  - hamstrings:         B: WNL  - gastrocnemius:   R: WNL, L: tight:, L: neutral  - piriformis:            B: tight, decreased 25%          - quadriceps:         B: WNL            - hip flexors:           B: WNL  - IT Bands:             B: WFL    Palpation: increased tension and tenderness in B upper traps and B levator scapulae     Sensation:  - Light Touch: intact  - Saddle: intact    Reflexes:   - Knee Jerk: 2+ B LE    CMS Impairment/Limitation/Restriction for FOTO Low Back Survey    Therapist reviewed FOTO scores for Ruddy Saini on 9/16/2019.   FOTO documents entered into [employer] - see Media section.    Limitation Score: 41%  Category: Mobility    Current : CK = at least 40% but < 60% impaired, limited or restricted  Goal: CJ = at least 20% but < 40% impaired, limited or restricted       TREATMENT   Treatment Time In: 4:50 pm  Treatment Time Out: 5:25 pm  Total Treatment time separate from Evaluation time: 15 minutes      Deana received the following manual therapy techniques: dry needling were applied to the: B neck and upper back for 15 minutes, including:    Application of TDN: Pt educated on benefits and potential side effects of dry needling. Educated pt on benefits, precautions, side effects following TDN. Educated pt to use heat following treatment sessions if pt is experiencing pain or soreness. Pt verbalized good understanding of education.  Pt signed written consent to dry needling. Pt gave verbal consent for DN    Pt received dry needling to the below listed muscles using 30 mm and 40 mm needles.  B splenius capitis  B upper traps  B  levator scapulae (2 sites)    Winding performed every 5 minutes during treatment.      Deana received hot pack for 10 minutes to neck and upper back following dry needling. Hot pack placed in pillow case and single towel layer between hot pack and skin.    Home Exercises Provided and Patient Education Provided     Education provided:   - Discussed the role of the PTA on the Rehab Team. Also discussed the use of the [facility] for communication.    Discussed the use of heat tonight to decrease any needle site pain      Assessment   Ruddy is a 39 y.o. female referred to outpatient Physical Therapy with a medical diagnosis of M79.10 (ICD-10-CM) - Myalgia. Pt presents with poor posture, weak scapular stabilizer, weak core contributing to neck pain, B shoulder pain, and low back pain. Patient also experiencing R knee pain. Will proceed in OP PT with postural reeducation, core strengthening, scapular stabilization, manual therapy, and dry needling to promote improved posture with work related activities and to decrease neck pain, shoulder flynn, and low back pain.    Good soft tissue response to dry needling evident by increased grasp with unilateral winding at all insertion points. Winding performed every 5 minutes during treatment. No adverse effects following treatment.      Pt prognosis is Good.   Pt will benefit from skilled outpatient Physical Therapy to address the deficits stated above and in the chart below, provide pt/family education, and to maximize pt's level of independence.     Plan of care discussed with patient: Yes  Pt's spiritual, cultural and educational needs considered and patient is agreeable to the plan of care and goals as stated below:     Anticipated Barriers for therapy: none    Medical Necessity is demonstrated by the following  History  Co-morbidities and personal factors that may impact the plan of care Co-morbidities:   none    Personal Factors:   no deficits     low    Examination  Body Structures and Functions, activity limitations and participation restrictions that may impact the plan of care Body Regions:   head  neck  back  lower extremities  upper extremities    Body Systems:    gross symmetry  strength    Participation Restrictions:   none    Activity limitations:   Learning and applying knowledge  no deficits    General Tasks and Commands  no deficits    Communication  no deficits    Mobility  no deficits    Self care  no deficits    Domestic Life  no deficits    Interactions/Relationships  no deficits    Life Areas  no deficits    Community and Social Life  no deficits         moderate   Clinical Presentation stable and uncomplicated low   Decision Making/ Complexity Score: low     Goals:  Short Term Goals: 4 weeks   1. Independent with HEP.  2. Report decreased cervical and B shoulder pain < or =  4/10 with adls such as turning her head, prolonged sitting, working on her computer, and cutting hair.  3. Increased MMT for B UE by 1 muscle grade to promote proper scapular stabilization to decrease cervical and B shoulder pain < or =  4/10 with adls such as turning her head, prolonged sitting, working on her computer, and cutting hair.  4. Report decreased lumbar pain < or =  4/10 with adls such as driving, working on her computer.  5. Increased MMT for B LE by 1 muscle grade to promote proper pelvic stability to decrease lumbar pain < or =  4/10 with adls such as driving, working on her computer.     Long Term Goals: 8 weeks .  6. Report decreased cervical and B shoulder pain < or =  2/10 with adls such as turning her head, prolonged sitting, working on her computer, and cutting hair.  7. Increased MMT for B UE to 5/5 muscle grade to promote proper scapular stabilization to decrease cervical and B shoulder pain < or =  2/10 with adls such as turning her head, prolonged sitting, working on her computer, and cutting hair.  8. Report decreased lumbar pain < or =  2/10 with  adls such as driving, working on her computer.    9. Increased flexibility in B piriformis to promote proper pelvic stability to decrease lumbar pain < or =  4/10 with adls such as driving, working on her computer.   10. Patient to achieve level on the FOTO Outcomes Measurement System.    Plan   Certification Period/Plan of care expiration: 9/16/2019 to 11/11/19.    Outpatient Physical Therapy 1 times weekly for 8 weeks to include the following interventions: Manual Therapy, Moist Heat/ Ice, Neuromuscular Re-ed, Patient Education, Therapeutic Activites, Therapeutic Exercise and dry needling..     Trey Matt, PT

## 2019-10-02 ENCOUNTER — CLINICAL SUPPORT (OUTPATIENT)
Dept: REHABILITATION | Facility: OTHER | Age: 39
End: 2019-10-02
Payer: COMMERCIAL

## 2019-10-02 DIAGNOSIS — M54.50 CHRONIC BILATERAL LOW BACK PAIN WITHOUT SCIATICA: ICD-10-CM

## 2019-10-02 DIAGNOSIS — G89.29 CHRONIC BILATERAL LOW BACK PAIN WITHOUT SCIATICA: ICD-10-CM

## 2019-10-02 DIAGNOSIS — M62.81 MUSCLE WEAKNESS OF UPPER EXTREMITY: ICD-10-CM

## 2019-10-02 DIAGNOSIS — R29.3 POOR POSTURE: ICD-10-CM

## 2019-10-02 PROCEDURE — 97140 MANUAL THERAPY 1/> REGIONS: CPT | Mod: PN

## 2019-10-02 PROCEDURE — 97110 THERAPEUTIC EXERCISES: CPT | Mod: PN

## 2019-10-02 NOTE — PROGRESS NOTES
"                            Physical Therapy Daily Treatment Note     Name: Ruddy Saini  Clinic Number: [id number]    Therapy Diagnosis:   Encounter Diagnoses   Name Primary?    Chronic bilateral low back pain without sciatica     Poor posture     Muscle weakness of upper extremity      Physician: Trey Read MD    Visit Date: 10/2/2019  Physician Orders: PT Eval and Treat dry needling  Medical Diagnosis: M79.10 (ICD-10-CM) - Myalgia  Evaluation Date: 2019  Authorization Period Expiration: 19  Plan of Care Certification Period: 19  Visit # / Visits authorized:  (based on medical necessity)    Time In: 5:05 PM  Time Out: 6:00 PM  Total Billable Time: 30 minutes    Precautions: Standard    Subjective   Pt reports she she is having more pain in her R knee than neck/upper back.    Response to previous treatment: decreased pain  Functional change: no change reported    Pain: 6/10  Location: knee  right    Objective     Deana received therapeutic exercises to develop strength, endurance, ROM, flexibility, posture and core stabilization for 10 minutes including:  Piriformis stretches 3 x 30"  IT band stretches 3 x 30"  Supine hip flexor stretch with strap 3 x 30"    Deana received the following manual therapy techniques: dry needling were applied to the: B neck and upper back for 20 minutes, includin minutes application and assessment of TDN: Pt educated on benefits and potential side effects of dry needling. Educated pt on benefits, precautions, side effects following TDN. Educated pt to use heat following treatment sessions if pt is experiencing pain or soreness. Pt verbalized good understanding of education.  Pt signed written consent to dry needling. Pt gave verbal consent for DN     Pt received dry needling to the below listed muscles using 40 mm needles.  R TFL  R trochanteric bursa  R mid IT band  R distal IT band ( 2 sites)  R vastus lateralis (3 sites)     Winding " performed every 5 minutes during treatment.      Deana received hot pack for 10 minutes to R hip/lateral thigh.      Home Exercises Provided and Patient Education Provided     Education provided:   Supine hip flexor stretch with strap  IT band stretch with strap  Piriformis stretch    Written Home Exercises Provided: yes.  Exercises were reviewed and Deana was able to demonstrate them prior to the end of the session.  Deana demonstrated good  understanding of the education provided.     See EMR under Patient Instructions for exercises provided 10/2/2019.    Assessment     Arrived today with increased R knee pain. Presenting with increased muscle tension in R T band. Instructed pt in R LE stretches. Proceeded with dry needling to R LE to decreased muscle tension and R knee pain.    Deana is progressing well towards her goals.   Pt prognosis is Good.     Pt will continue to benefit from skilled outpatient physical therapy to address the deficits listed in the problem list box on initial evaluation, provide pt/family education and to maximize pt's level of independence in the home and community environment.     Pt's spiritual, cultural and educational needs considered and pt agreeable to plan of care and goals.    Anticipated barriers to physical therapy: none    Goals:   Short Term Goals: 4 weeks   1. Independent with HEP. (ongoing)  2. Report decreased cervical and B shoulder pain < or =  4/10 with adls such as turning her head, prolonged sitting, working on her computer, and cutting hair. (in progress, not met)  3. Increased MMT for B UE by 1 muscle grade to promote proper scapular stabilization to decrease cervical and B shoulder pain < or =  4/10 with adls such as turning her head, prolonged sitting, working on her computer, and cutting hair.(in progress, not met)  4. Report decreased lumbar pain < or =  4/10 with adls such as driving, working on her computer. (in progress, not met)  6. Increased MMT for B LE by 1  muscle grade to promote proper pelvic stability to decrease lumbar pain < or =  4/10 with adls such as driving, working on her computer.(in progress, not met)      Long Term Goals: 8 weeks .  7. Report decreased cervical and B shoulder pain < or =  2/10 with adls such as turning her head, prolonged sitting, working on her computer, and cutting hair.(in progress, not met)  8. Increased MMT for B UE to 5/5 muscle grade to promote proper scapular stabilization to decrease cervical and B shoulder pain < or =  2/10 with adls such as turning her head, prolonged sitting, working on her computer, and cutting hair.(in progress, not met)  9. Report decreased lumbar pain < or =  2/10 with adls such as driving, working on her computer.  (in progress, not met)  10. Increased flexibility in B piriformis to promote proper pelvic stability to decrease lumbar pain < or =  4/10 with adls such as driving, working on her computer. (in progress, not met)  11. Patient to achieve CJ (at least 20% < 40% impaired, limited or restricted) level on the FOTO Outcomes Measurement System. (in progress)      Plan     Continue with postural reeducation, scapular stabilization, core strengthening, B LE stretches and strengthening, and dry needling.    Trey Matt, PT

## 2019-10-07 ENCOUNTER — CLINICAL SUPPORT (OUTPATIENT)
Dept: REHABILITATION | Facility: OTHER | Age: 39
End: 2019-10-07
Payer: COMMERCIAL

## 2019-10-07 DIAGNOSIS — M54.50 CHRONIC BILATERAL LOW BACK PAIN WITHOUT SCIATICA: ICD-10-CM

## 2019-10-07 DIAGNOSIS — M62.81 MUSCLE WEAKNESS OF UPPER EXTREMITY: ICD-10-CM

## 2019-10-07 DIAGNOSIS — G89.29 CHRONIC BILATERAL LOW BACK PAIN WITHOUT SCIATICA: ICD-10-CM

## 2019-10-07 DIAGNOSIS — R29.3 POOR POSTURE: ICD-10-CM

## 2019-10-07 PROCEDURE — 97140 MANUAL THERAPY 1/> REGIONS: CPT | Mod: PN

## 2019-10-07 PROCEDURE — 97110 THERAPEUTIC EXERCISES: CPT | Mod: PN

## 2019-10-07 NOTE — PATIENT INSTRUCTIONS
SI joint Muscle energy for L Posterior/R anterior rotation              Bring both knees up towards your chest as shown in the picture.  Place your Left hand on top of your Left thigh, and your Right hand on the back of your Right thigh.  Push your legs into each hand with about 50% effort.  Perform 3 reps and hold for 3 seconds. Perform twice a day.    Copyright © 9594-0135 [employer]    HIP ABDUCTION - SIDELYING            While lying on your side, slowly raise up your top leg to the side. Keep your knee straight and maintain your toes pointed forward the entire time. Keep your leg in-line with your body.  The bottom leg can be bent to stabilize your body.    Hold and then lower yourself and repeat. Perform 10 reps holding for 10 seconds.    Perform once a day.    Copyright © 6554-1688 [employer]    CLAM SHELLS      While lying on your side with your knees bent, draw up the top knee while keeping contact of your feet together.    Do not let your pelvis roll back during the lifting movement.    Hold and then lower yourself and repeat. Perform 10 reps holding for 10 seconds.    Perform once a day.    Copyright © 8540-2680 [employer]    Abdominal Bracing         With neutral spine, tighten pelvic floor and abdominals. Hold for  10  Seconds. Repeat _10__ times. Do _1  time a day.      Copyright © [employer]. All rights reserved.   (Home) Flexion: Pelvic Tilt        Lie with neck supported, knees bent, feet flat. Tighten and suck stomach in, pushing back down against surface. Do not push down with legs.  Hold for  10  Seconds. Repeat _10__ times. Do _1  time a day.  Copyright © [employer]. All rights reserved.

## 2019-10-07 NOTE — PROGRESS NOTES
"                            Physical Therapy Daily Treatment Note     Name: Ruddy Saini  Clinic Number: [id number]    Therapy Diagnosis:   Encounter Diagnoses   Name Primary?    Chronic bilateral low back pain without sciatica     Poor posture     Muscle weakness of upper extremity      Physician: Trey Read MD    Visit Date: 10/7/2019  Physician Orders: PT Eval and Treat dry needling  Medical Diagnosis: M79.10 (ICD-10-CM) - Myalgia  Evaluation Date: 2019  Authorization Period Expiration: 19  Plan of Care Certification Period: 19  Visit # / Visits authorized: 3/20 (based on medical necessity)    Time In: 5:05 PM  Time Out: 6:12 PM  Total Billable Time:  minutes    Precautions: Standard    Subjective   States she is having pain in the left side of her lower back and in the left side of her neck. States her neck has been stiff yesterday and today. Still having pain in her knee, but she was driving this weekend. States her R knee did feel better after the dry needling last session    Response to previous treatment: decreased pain  Functional change: no change reported    Pain: 10  Location: knee  right    Objective     Deana received therapeutic exercises to develop strength, endurance, ROM, flexibility, posture and core stabilization for 30 minutes including:  Push/pull 3 x 3"  TA bracing 10 x 10"  TA bracing with posterior tilt 10 x 10"  Piriformis stretches 3 x 30"  side lying clams 10 x 10"  Side lying hip abd 10 x 10"  IT band stretches 3 x 30"  Supine hip flexor stretch with strap 3 x 30"    Deana received the following manual therapy techniques: dry needling were applied to the: B neck and upper back for 20 minutes, includin minutes application and assessment of TDN: Pt educated on benefits and potential side effects of dry needling. Educated pt on benefits, precautions, side effects following TDN. Educated pt to use heat following treatment sessions if pt is " experiencing pain or soreness. Pt verbalized good understanding of education.  Pt signed written consent to dry needling. Pt gave verbal consent for DN     Pt received dry needling to the below listed muscles using 30 mm and 40 mm needles.  B semispinalis  B splenius capitis  B upper traps  B levator scapulae (2 sites)  Winding performed every 5 minutes during treatment.      Deana received hot pack for 5 minutes to neck/upper back      Home Exercises Provided and Patient Education Provided     Education provided:   Push/pull  TA bracing  Posterior pelvic tilt  side lying hip abd  Side lying clams    Written Home Exercises Provided: yes.  Exercises were reviewed and Deana was able to demonstrate them prior to the end of the session.  Deana demonstrated good  understanding of the education provided.     See EMR under Patient Instructions for exercises provided today and 10/2/2019.    Assessment     Arrived today with increased left sided low back pain. Found to be in pelvic dysfunction. Push/pull promoted proper alignment. added to patient's HEP.    Deana is progressing well towards her goals.   Pt prognosis is Good.     Pt will continue to benefit from skilled outpatient physical therapy to address the deficits listed in the problem list box on initial evaluation, provide pt/family education and to maximize pt's level of independence in the home and community environment.     Pt's spiritual, cultural and educational needs considered and pt agreeable to plan of care and goals.    Anticipated barriers to physical therapy: none    Goals:   Short Term Goals: 4 weeks   1. Independent with HEP. (ongoing)  2. Report decreased cervical and B shoulder pain < or =  4/10 with adls such as turning her head, prolonged sitting, working on her computer, and cutting hair. (in progress, not met)  3. Increased MMT for B UE by 1 muscle grade to promote proper scapular stabilization to decrease cervical and B shoulder pain < or =  4/10  with adls such as turning her head, prolonged sitting, working on her computer, and cutting hair.(in progress, not met)  4. Report decreased lumbar pain < or =  4/10 with adls such as driving, working on her computer. (in progress, not met)  6. Increased MMT for B LE by 1 muscle grade to promote proper pelvic stability to decrease lumbar pain < or =  4/10 with adls such as driving, working on her computer.(in progress, not met)      Long Term Goals: 8 weeks .  7. Report decreased cervical and B shoulder pain < or =  2/10 with adls such as turning her head, prolonged sitting, working on her computer, and cutting hair.(in progress, not met)  8. Increased MMT for B UE to 5/5 muscle grade to promote proper scapular stabilization to decrease cervical and B shoulder pain < or =  2/10 with adls such as turning her head, prolonged sitting, working on her computer, and cutting hair.(in progress, not met)  9. Report decreased lumbar pain < or =  2/10 with adls such as driving, working on her computer.  (in progress, not met)  10. Increased flexibility in B piriformis to promote proper pelvic stability to decrease lumbar pain < or =  4/10 with adls such as driving, working on her computer. (in progress, not met)  11. Patient to achieve CJ (at least 20% < 40% impaired, limited or restricted) level on the FOTO Outcomes Measurement System. (in progress)      Plan     Continue with postural reeducation, scapular stabilization, core strengthening, B LE stretches and strengthening, and dry needling.    Trey Matt, PT

## 2019-10-28 ENCOUNTER — CLINICAL SUPPORT (OUTPATIENT)
Dept: REHABILITATION | Facility: OTHER | Age: 39
End: 2019-10-28
Payer: COMMERCIAL

## 2019-10-28 DIAGNOSIS — M62.81 MUSCLE WEAKNESS OF UPPER EXTREMITY: ICD-10-CM

## 2019-10-28 DIAGNOSIS — G89.29 CHRONIC BILATERAL LOW BACK PAIN WITHOUT SCIATICA: ICD-10-CM

## 2019-10-28 DIAGNOSIS — M54.50 CHRONIC BILATERAL LOW BACK PAIN WITHOUT SCIATICA: ICD-10-CM

## 2019-10-28 DIAGNOSIS — R29.3 POOR POSTURE: ICD-10-CM

## 2019-10-28 PROCEDURE — 97110 THERAPEUTIC EXERCISES: CPT | Mod: PN

## 2019-10-28 PROCEDURE — 97140 MANUAL THERAPY 1/> REGIONS: CPT | Mod: PN | Performed by: PHYSICAL THERAPIST

## 2019-10-28 NOTE — PROGRESS NOTES
Time: 5:45-6:00pm  10/28/2019    Subjective: Patient states the dry needling helping and interested in it again. Pain is mostly in front of L shoulder and along shoulder blade     Objective:    Application of FDN: Pt educated on benefits and potential side effects of dry needling. Educated pt on benefits, precautions, side effects followign IDN. Educated pt to use heat following treatment sessions if pt is experiencing pain or soreness. Pt verbalized good understanding of education.  Pt signed written consent to dry needling Rx. Pt gave verbal consent for DN    Pt received dry needling to the below listed muscles using 30, 40mm needles.  B semispinalis  B splenius capitis  B upper traps  B levator scapulae   L rhomboids   L infraspinatus     Assessment:    Patient tolerated functional dry needling well with no adverse effects noted. Decreased subjective report of L anterior shoulder following DN to infraspinatus    Irlanda Sosa, PT

## 2019-10-28 NOTE — PROGRESS NOTES
"                            Physical Therapy Daily Treatment Note     Name: Ruddy Saini  Clinic Number: [id number]    Therapy Diagnosis:   Encounter Diagnoses   Name Primary?    Chronic bilateral low back pain without sciatica     Poor posture     Muscle weakness of upper extremity      Physician: Trey Read MD    Visit Date: 10/28/2019  Physician Orders: PT Eval and Treat dry needling  Medical Diagnosis: M79.10 (ICD-10-CM) - Myalgia  Evaluation Date: 2019  Authorization Period Expiration: 19  Plan of Care Certification Period: 19  Visit # / Visits authorized:  (based on medical necessity)    Time In: 5:00 PM  Time Out: 6:00 PM  Total Billable Time:45 minutes    Precautions: Standard    Subjective   States pain is mostly on the L UT today. States she has decreased her activities to help out with the pain.    Response to previous treatment: decreased pain  Functional change: no change reported    Pain: 6/10  Location: knee  Right, neck, L UT    Objective     Deana received therapeutic exercises to develop strength, endurance, ROM, flexibility, posture and core stabilization for 45 minutes including:  Push/pull 3 x 3"  TA bracing 10 x 10" (requested not to do today)  TA bracing with posterior tilt 10 x 10" (requested not to do today)  Piriformis stretches 3 x 30"  side lying clams 10 x 10"  Side lying hip abd 10 x 10"  IT band stretches 3 x 30" (manually by PTA)  Supine hip flexor stretch with strap 3 x 30"     +1/2 foam pec stretch x 3 mins  +1/2 foam serratus punch 30x  +Prone I's, T's, Y's 20 x 3"    Please refer to Irlanda Sosa, PT's note for dry needle details    Deana received the following manual therapy techniques: dry needling were applied to the: B neck and upper back for 20 minutes, includin minutes application and assessment of TDN: Pt educated on benefits and potential side effects of dry needling. Educated pt on benefits, precautions, side effects " following TDN. Educated pt to use heat following treatment sessions if pt is experiencing pain or soreness. Pt verbalized good understanding of education.  Pt signed written consent to dry needling. Pt gave verbal consent for DN     Pt received dry needling to the below listed muscles using 30 mm and 40 mm needles.  B semispinalis  B splenius capitis  B upper traps  B levator scapulae (2 sites)  Winding performed every 5 minutes during treatment.      Deana received hot pack for 5 minutes to neck/upper back      Home Exercises Provided and Patient Education Provided     Education provided:   Push/pull  TA bracing  Posterior pelvic tilt  side lying hip abd  Side lying clams    Written Home Exercises Provided: yes.  Exercises were reviewed and Deana was able to demonstrate them prior to the end of the session.  Deana demonstrated good  understanding of the education provided.     See EMR under Patient Instructions for exercises provided today and 10/2/2019.    Assessment     Added prone exercises for increased scapular stabilization today. Pt required VC to avoid compensatory pattern of shoulder hiking during prone scap series.     Deana is progressing well towards her goals.   Pt prognosis is Good.     Pt will continue to benefit from skilled outpatient physical therapy to address the deficits listed in the problem list box on initial evaluation, provide pt/family education and to maximize pt's level of independence in the home and community environment.     Pt's spiritual, cultural and educational needs considered and pt agreeable to plan of care and goals.    Anticipated barriers to physical therapy: none    Goals:   Short Term Goals: 4 weeks   1. Independent with HEP. (ongoing)  2. Report decreased cervical and B shoulder pain < or =  4/10 with adls such as turning her head, prolonged sitting, working on her computer, and cutting hair. (in progress, not met)  3. Increased MMT for B UE by 1 muscle grade to promote  proper scapular stabilization to decrease cervical and B shoulder pain < or =  4/10 with adls such as turning her head, prolonged sitting, working on her computer, and cutting hair.(in progress, not met)  4. Report decreased lumbar pain < or =  4/10 with adls such as driving, working on her computer. (in progress, not met)  6. Increased MMT for B LE by 1 muscle grade to promote proper pelvic stability to decrease lumbar pain < or =  4/10 with adls such as driving, working on her computer.(in progress, not met)      Long Term Goals: 8 weeks .  7. Report decreased cervical and B shoulder pain < or =  2/10 with adls such as turning her head, prolonged sitting, working on her computer, and cutting hair.(in progress, not met)  8. Increased MMT for B UE to 5/5 muscle grade to promote proper scapular stabilization to decrease cervical and B shoulder pain < or =  2/10 with adls such as turning her head, prolonged sitting, working on her computer, and cutting hair.(in progress, not met)  9. Report decreased lumbar pain < or =  2/10 with adls such as driving, working on her computer.  (in progress, not met)  10. Increased flexibility in B piriformis to promote proper pelvic stability to decrease lumbar pain < or =  4/10 with adls such as driving, working on her computer. (in progress, not met)  11. Patient to achieve CJ (at least 20% < 40% impaired, limited or restricted) level on the FOTO Outcomes Measurement System. (in progress)      Plan     Continue with postural reeducation, scapular stabilization, core strengthening, B LE stretches and strengthening, and dry needling.    Brett Wilhelm, PTA

## 2019-11-11 ENCOUNTER — CLINICAL SUPPORT (OUTPATIENT)
Dept: REHABILITATION | Facility: OTHER | Age: 39
End: 2019-11-11
Payer: COMMERCIAL

## 2019-11-11 DIAGNOSIS — G89.29 CHRONIC BILATERAL LOW BACK PAIN WITHOUT SCIATICA: ICD-10-CM

## 2019-11-11 DIAGNOSIS — M62.81 MUSCLE WEAKNESS OF UPPER EXTREMITY: ICD-10-CM

## 2019-11-11 DIAGNOSIS — R29.3 POOR POSTURE: ICD-10-CM

## 2019-11-11 DIAGNOSIS — M54.50 CHRONIC BILATERAL LOW BACK PAIN WITHOUT SCIATICA: ICD-10-CM

## 2019-11-11 PROCEDURE — 97110 THERAPEUTIC EXERCISES: CPT | Mod: PN

## 2019-11-11 PROCEDURE — 97140 MANUAL THERAPY 1/> REGIONS: CPT | Mod: PN

## 2019-11-11 NOTE — PROGRESS NOTES
"                            Physical Therapy Daily Treatment Note     Name: Ruddy Saini  Clinic Number: [id number]    Therapy Diagnosis:   Encounter Diagnoses   Name Primary?    Chronic bilateral low back pain without sciatica     Poor posture     Muscle weakness of upper extremity      Physician: Trey Read MD    Visit Date: 2019  Physician Orders: PT Eval and Treat dry needling  Medical Diagnosis: M79.10 (ICD-10-CM) - Myalgia  Evaluation Date: 2019  Authorization Period Expiration: 19  Plan of Care Certification Period: 19  Visit # / Visits authorized:  (based on medical necessity)    Time In: 2:05 PM  Time Out: 2:57 PM  Total Billable Time: 42 minutes    Precautions: Standard    Subjective   Reporting 5/10 pain in anterior L shoulder.    Response to previous treatment: decreased pain  Functional change: no change reported    Pain: 6-7/10  Location: knee     Objective     Deana received therapeutic exercises to develop strength, endurance, ROM, flexibility, posture and core stabilization for 12 minutes including:  Prone I's, T's, Y's 20 x 3"        Push/pull 3 x 3"  TA bracing 10 x 10" (requested not to do today)  TA bracing with posterior tilt 10 x 10" (requested not to do today)  Piriformis stretches 3 x 30"  side lying clams 10 x 10"  Side lying hip abd 10 x 10"  IT band stretches 3 x 30" (manually by PTA)  Supine hip flexor stretch with strap 3 x 30"   1/2 foam pec stretch x 3 mins  1/2 foam serratus punch 30x      Deana received the following manual therapy techniques: dry needling were applied to the: B neck and upper back for 30 minutes, includin minutes application and assessment of TDN: Pt educated on benefits and potential side effects of dry needling. Educated pt on benefits, precautions, side effects following TDN. Educated pt to use heat following treatment sessions if pt is experiencing pain or soreness. Pt verbalized good understanding of " education.  Pt signed written consent to dry needling. Pt gave verbal consent for DN     Pt received dry needling to the below listed muscles using 30 mm and 40 mm needles.  L biceps tendon (pecking)  B semispinalis  B splenius capitis  B upper traps  B levator scapulae (2 sites)  B infraspinatus  Winding performed every 5 minutes during treatment.      Deana received hot pack for  minutes to neck/upper back      Home Exercises Provided and Patient Education Provided     Education provided:   No new HEP  Discussed the use of heat to needle sites if pt experiences pain.    Written Home Exercises Provided: yes.  Exercises were reviewed and Deaan was able to demonstrate them prior to the end of the session.  Deana demonstrated good  understanding of the education provided.     See EMR under Patient Instructions for exercises provided today and 10/2/2019.    Assessment     Dry needling pecking to L anterior shoulder. Good soft tissue response to dry needling evident by increased grasp with unilateral winding at all insertion points. Winding performed every 5 minutes during treatment. No adverse effects following treatment. Patient to continue to benefit from dry needling as well as scapular strengthening.      Deana is progressing well towards her goals.   Pt prognosis is Good.     Pt will continue to benefit from skilled outpatient physical therapy to address the deficits listed in the problem list box on initial evaluation, provide pt/family education and to maximize pt's level of independence in the home and community environment.     Pt's spiritual, cultural and educational needs considered and pt agreeable to plan of care and goals.    Anticipated barriers to physical therapy: none    Goals:   Short Term Goals: 4 weeks   1. Independent with HEP. (ongoing)  2. Report decreased cervical and B shoulder pain < or =  4/10 with adls such as turning her head, prolonged sitting, working on her computer, and cutting hair. (in  progress, not met)  3. Increased MMT for B UE by 1 muscle grade to promote proper scapular stabilization to decrease cervical and B shoulder pain < or =  4/10 with adls such as turning her head, prolonged sitting, working on her computer, and cutting hair.(in progress, not met)  4. Report decreased lumbar pain < or =  4/10 with adls such as driving, working on her computer. (in progress, not met)  6. Increased MMT for B LE by 1 muscle grade to promote proper pelvic stability to decrease lumbar pain < or =  4/10 with adls such as driving, working on her computer.(in progress, not met)      Long Term Goals: 8 weeks .  7. Report decreased cervical and B shoulder pain < or =  2/10 with adls such as turning her head, prolonged sitting, working on her computer, and cutting hair.(in progress, not met)  8. Increased MMT for B UE to 5/5 muscle grade to promote proper scapular stabilization to decrease cervical and B shoulder pain < or =  2/10 with adls such as turning her head, prolonged sitting, working on her computer, and cutting hair.(in progress, not met)  9. Report decreased lumbar pain < or =  2/10 with adls such as driving, working on her computer.  (in progress, not met)  10. Increased flexibility in B piriformis to promote proper pelvic stability to decrease lumbar pain < or =  4/10 with adls such as driving, working on her computer. (in progress, not met)  11. Patient to achieve CJ (at least 20% < 40% impaired, limited or restricted) level on the FOTO Outcomes Measurement System. (in progress)      Plan     Continue with postural reeducation, scapular stabilization, core strengthening, B LE stretches and strengthening, and dry needling.    Trey Matt, PT

## 2019-12-06 ENCOUNTER — DOCUMENTATION ONLY (OUTPATIENT)
Dept: REHABILITATION | Facility: OTHER | Age: 39
End: 2019-12-06

## 2020-01-10 ENCOUNTER — PATIENT MESSAGE (OUTPATIENT)
Dept: INTERNAL MEDICINE | Facility: CLINIC | Age: 40
End: 2020-01-10

## 2020-01-10 DIAGNOSIS — L85.3 XEROSIS CUTIS: Primary | ICD-10-CM

## 2020-01-10 RX ORDER — FLUOCINONIDE 0.5 MG/G
CREAM TOPICAL 2 TIMES DAILY
Qty: 30 G | Refills: 1 | Status: SHIPPED | OUTPATIENT
Start: 2020-01-10 | End: 2021-06-29 | Stop reason: SDUPTHER

## 2020-03-18 ENCOUNTER — DOCUMENTATION ONLY (OUTPATIENT)
Dept: REHABILITATION | Facility: OTHER | Age: 40
End: 2020-03-18

## 2020-03-18 NOTE — PROGRESS NOTES
Outpatient Therapy Discharge Summary     Name: Ruddy Saini  Clinic Number: [id number]    Therapy Diagnosis:   Chronic bilateral low back pain without sciatica  Poor posture  Muscle weakness of upper extremity  Physician: No ref. provider found    Physician Orders: PT Eval and Treat dry needling  Medical Diagnosis: M79.10 (ICD-10-CM) - Myalgia  Evaluation Date: 9/16/2019    Date of Last visit: 11/11/2019  Total Visits Received: 5  Cancelled Visits: 3  No Show Visits: 1    Assessment    Goals:   Short Term Goals: 4 weeks   1. Independent with HEP. Not met  2. Report decreased cervical and B shoulder pain < or =  4/10 with adls such as turning her head, prolonged sitting, working on her computer, and cutting hair.  not met  3. Increased MMT for B UE by 1 muscle grade to promote proper scapular stabilization to decrease cervical and B shoulder pain < or =  4/10 with adls such as turning her head, prolonged sitting, working on her computer, and cutting hair. not met  4. Report decreased lumbar pain < or =  4/10 with adls such as driving, working on her computer. not met  6. Increased MMT for B LE by 1 muscle grade to promote proper pelvic stability to decrease lumbar pain < or =  4/10 with adls such as driving, working on her computer. not met     Long Term Goals: 8 weeks .  7. Report decreased cervical and B shoulder pain < or =  2/10 with adls such as turning her head, prolonged sitting, working on her computer, and cutting hair. not met  8. Increased MMT for B UE to 5/5 muscle grade to promote proper scapular stabilization to decrease cervical and B shoulder pain < or =  2/10 with adls such as turning her head, prolonged sitting, working on her computer, and cutting hair. not met  9. Report decreased lumbar pain < or =  2/10 with adls such as driving, working on her computer.  not met  10. Increased flexibility in B piriformis to promote proper pelvic stability to decrease lumbar pain < or =  4/10 with adls  such as driving, working on her computer. not met  11. Patient to achieve CJ (at least 20% < 40% impaired, limited or restricted) level on the FOTO Outcomes Measurement System. Not met      Discharge reason: Other:  Patient did not show for remaining scheduled appointments and did not reschedule.    Plan   This patient is discharged from Physical Therapy

## 2020-03-19 ENCOUNTER — PATIENT MESSAGE (OUTPATIENT)
Dept: INTERNAL MEDICINE | Facility: CLINIC | Age: 40
End: 2020-03-19

## 2020-03-20 ENCOUNTER — PATIENT MESSAGE (OUTPATIENT)
Dept: INTERNAL MEDICINE | Facility: CLINIC | Age: 40
End: 2020-03-20

## 2020-03-20 NOTE — TELEPHONE ENCOUNTER
I think she would need to be seen for flu testing.  As far as I know we do not have that available as a drive-thru test.  Thanks.

## 2020-03-24 ENCOUNTER — PATIENT MESSAGE (OUTPATIENT)
Dept: INTERNAL MEDICINE | Facility: CLINIC | Age: 40
End: 2020-03-24

## 2020-03-24 NOTE — TELEPHONE ENCOUNTER
No further testing would be recommended for now.  If she has already been tested for COVID-19, we can just wait for those results.  It sounds like there is nothing too worrisome at this point.  Respiratory symptoms would be the biggest concern.  Thanks.

## 2020-09-30 ENCOUNTER — PATIENT MESSAGE (OUTPATIENT)
Dept: FAMILY MEDICINE | Facility: CLINIC | Age: 40
End: 2020-09-30

## 2020-09-30 ENCOUNTER — OFFICE VISIT (OUTPATIENT)
Dept: FAMILY MEDICINE | Facility: CLINIC | Age: 40
End: 2020-09-30
Payer: COMMERCIAL

## 2020-09-30 DIAGNOSIS — M25.561 ACUTE PAIN OF RIGHT KNEE: ICD-10-CM

## 2020-09-30 DIAGNOSIS — Z86.16 HISTORY OF 2019 NOVEL CORONAVIRUS DISEASE (COVID-19): ICD-10-CM

## 2020-09-30 DIAGNOSIS — Z00.00 PREVENTATIVE HEALTH CARE: ICD-10-CM

## 2020-09-30 DIAGNOSIS — R00.2 PALPITATIONS: Primary | ICD-10-CM

## 2020-09-30 PROCEDURE — 99214 OFFICE O/P EST MOD 30 MIN: CPT | Mod: 95,,, | Performed by: PHYSICIAN ASSISTANT

## 2020-09-30 PROCEDURE — 99214 PR OFFICE/OUTPT VISIT, EST, LEVL IV, 30-39 MIN: ICD-10-PCS | Mod: 95,,, | Performed by: PHYSICIAN ASSISTANT

## 2020-09-30 NOTE — PATIENT INSTRUCTIONS
A few reminders from today:    Schedule labs and x ray  Schedule nursing visit for EKG.  F/U 10/26/2020      Follow up with me if needed.   Please go to ER/urgent care if after hours or symptoms persist/worsen.       Do not hesitate to get in touch with me should you have any further questions.     Thank you for trusting me with your care!  I wish you health and happiness.    -Elisa Mccord PA-C

## 2020-09-30 NOTE — PROGRESS NOTES
Subjective:       Patient ID: Ruddy Saini is a 40 y.o. female.    VIRTUAL TELENOTE    Start time: 11:08am  Chief complaint: establish care, post covid  The patient location is: work in [place], [place]  The patient arrived at: 11:06am  Present with the patient at the time of the telemed/virtual assessment: nobody  End time:  11:38am  Total time spent with patient: 30 minutes    The attending portion of this evaluation, treatment, and documentation was performed per Elisa Mccord PA-C via audiovisual visit.     Chief Complaint: Establish Care    HPI     Patient is new to me and this clinic.     Patient is a 40 year old female with nephrolithiasis history, and chronic neck/back pain. She presents today for virtual visit to establish care and to discuss post-covid complications.     Beginning of march she was diagnosed with covid19. She had a mild case. Fever up to 100.4. She did lose sense of smell and taste. She was also extremely fatigued, but that was the extent of her symptoms. Fatigue resolved within about 3 weeks.  She admits to occasional palpitations at rest, 1-2 times daily, since recovering from covid. Denies chest pain. No overt SOB. She has never had this before covid. Denies significant anxiety.   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     Also, patient states that a few days ago, she tripped and fell directly onto her right knee. IT was painful immediately after on the lateral aspect of her knee and she had minimal swelling. The pain is significantly improving and she has full ROM and can bare weight without difficulty, but the anterior aspect of her knee is bruised and still sensitive to touch. The pain is interfering with her ability to fully participate in yoga.     Review of Systems   Constitutional: Negative for activity change, appetite change, chills, fatigue, fever and unexpected weight change.   HENT: Negative for nasal congestion, hearing loss, postnasal drip,  rhinorrhea, sore throat and trouble swallowing.    Eyes: Negative for discharge and visual disturbance.   Respiratory: Negative for cough, chest tightness, shortness of breath and wheezing.    Cardiovascular: Positive for palpitations. Negative for chest pain.   Gastrointestinal: Negative for blood in stool, constipation, diarrhea, nausea, vomiting and reflux.   Endocrine: Negative for polydipsia and polyuria.   Genitourinary: Negative for bladder incontinence, difficulty urinating, dysuria, flank pain, frequency, hematuria, menstrual problem and urgency.   Musculoskeletal: Positive for leg pain (right knee pain/bruise). Negative for arthralgias, joint swelling and neck pain.   Integumentary:  Negative for rash.   Neurological: Negative for vertigo, seizures, syncope, speech difficulty, weakness, light-headedness and headaches.   Psychiatric/Behavioral: Negative for confusion, dysphoric mood and sleep disturbance. The patient is not nervous/anxious.          Objective:      Physical Exam  Vitals signs reviewed.   Constitutional:       General: She is not in acute distress.     Appearance: Normal appearance. She is not ill-appearing, toxic-appearing or diaphoretic.   HENT:      Head: Normocephalic and atraumatic.   Musculoskeletal:      Right knee: She exhibits ecchymosis (over anterior patella). She exhibits normal range of motion, no swelling, no effusion, no deformity, no laceration and no erythema.   Neurological:      General: No focal deficit present.      Mental Status: She is alert and oriented to person, place, and time.   Psychiatric:         Mood and Affect: Mood normal.         Behavior: Behavior normal.         Thought Content: Thought content normal.         Judgment: Judgment normal.         Full exam was not able to be performed today, as this was a virtual visit.     Assessment:       1. Palpitations    2. History of 2019 novel coronavirus disease (COVID-19)    3. Acute pain of right knee    4.  Preventative health care        Plan:       1. Palpitations  - EKG 12-lead  - Comprehensive metabolic panel; Future  - TSH; Future  - Magnesium; Future  -ER precautions given    2. History of 2019 novel coronavirus disease (COVID-19)  -antibody test ordered    3. Acute pain of right knee  -discussed that if pain persists despite a possible normal x ray, should get knee examined in person  - X-Ray Knee 3 View Right; Future    4. Preventative health care  - CBC auto differential; Future  - Hemoglobin A1C; Future    F/U schedule with me 10/26.     CARE GAPS:  Will get flu vaccine at work.

## 2020-11-23 ENCOUNTER — OFFICE VISIT (OUTPATIENT)
Dept: OBSTETRICS AND GYNECOLOGY | Facility: CLINIC | Age: 40
End: 2020-11-23
Attending: OBSTETRICS & GYNECOLOGY
Payer: COMMERCIAL

## 2020-11-23 VITALS
HEIGHT: 65 IN | BODY MASS INDEX: 27.29 KG/M2 | SYSTOLIC BLOOD PRESSURE: 116 MMHG | DIASTOLIC BLOOD PRESSURE: 80 MMHG | WEIGHT: 163.81 LBS

## 2020-11-23 DIAGNOSIS — N92.6 IRREGULAR MENSES: ICD-10-CM

## 2020-11-23 DIAGNOSIS — Z01.419 WOMEN'S ANNUAL ROUTINE GYNECOLOGICAL EXAMINATION: Primary | ICD-10-CM

## 2020-11-23 PROCEDURE — 1126F AMNT PAIN NOTED NONE PRSNT: CPT | Mod: S$GLB,,, | Performed by: OBSTETRICS & GYNECOLOGY

## 2020-11-23 PROCEDURE — 3008F PR BODY MASS INDEX (BMI) DOCUMENTED: ICD-10-PCS | Mod: CPTII,S$GLB,, | Performed by: OBSTETRICS & GYNECOLOGY

## 2020-11-23 PROCEDURE — 99999 PR PBB SHADOW E&M-EST. PATIENT-LVL III: CPT | Mod: PBBFAC,,, | Performed by: OBSTETRICS & GYNECOLOGY

## 2020-11-23 PROCEDURE — 99395 PREV VISIT EST AGE 18-39: CPT | Mod: S$GLB,,, | Performed by: OBSTETRICS & GYNECOLOGY

## 2020-11-23 PROCEDURE — 1126F PR PAIN SEVERITY QUANTIFIED, NO PAIN PRESENT: ICD-10-PCS | Mod: S$GLB,,, | Performed by: OBSTETRICS & GYNECOLOGY

## 2020-11-23 PROCEDURE — 99999 PR PBB SHADOW E&M-EST. PATIENT-LVL III: ICD-10-PCS | Mod: PBBFAC,,, | Performed by: OBSTETRICS & GYNECOLOGY

## 2020-11-23 PROCEDURE — 99395 PR PREVENTIVE VISIT,EST,18-39: ICD-10-PCS | Mod: S$GLB,,, | Performed by: OBSTETRICS & GYNECOLOGY

## 2020-11-23 PROCEDURE — 3008F BODY MASS INDEX DOCD: CPT | Mod: CPTII,S$GLB,, | Performed by: OBSTETRICS & GYNECOLOGY

## 2020-11-23 RX ORDER — IBUPROFEN 100 MG/5ML
1000 SUSPENSION, ORAL (FINAL DOSE FORM) ORAL DAILY
COMMUNITY

## 2020-11-23 NOTE — PROGRESS NOTES
Ruddy Saini is a 40 y.o. female  who presents for annual exam.   Her menses generally occur monthly, but occasionally she skips a period.  She describes most recently bleeding from 10/14/20 ---> 20 with heavy flow.  She feels that she may have skipped a period prior to this prolonged bleeding episode.  She has been trying to conceive for about 2 years.  Earlier this year, she had been seen at [facility] and had undergone several rounds of Clomid.  Denies recent changes in her medical surgical history.  No gyn complaints.  Patient's last menstrual period was 10/14/2020 (exact date).     UPT today: Negative    19 Pap: Negative, HPV: Negative    Past Medical History:   Diagnosis Date    Recurrent nephrolithiasis 2019       History reviewed. No pertinent surgical history.    OB History        1    Para        Term                AB   1    Living   0       SAB        TAB        Ectopic        Multiple        Live Births                     ROS:  GENERAL: Feeling well overall.   SKIN: Denies rash or lesions.   HEAD: Denies head injury or headache.   NODES: Denies enlarged lymph nodes.   CHEST: Denies chest pain or shortness of breath.   CARDIOVASCULAR: Denies palpitations or left sided chest pain.   ABDOMEN: No abdominal pain, nausea, vomiting or rectal bleeding.   URINARY: No dysuria or hematuria.  REPRODUCTIVE: See HPI.   BREASTS: Denies pain, lumps, or nipple discharge.   HEMATOLOGIC: No easy bruisability or excessive bleeding.   MUSCULOSKELETAL: Denies joint pain or swelling.   NEUROLOGIC: Denies syncope or weakness.   PSYCHIATRIC: Denies depression.    PE:   (chaperone present during entire exam)  APPEARANCE: Well nourished, well developed, in no acute distress.  BREASTS: Symmetrical, no skin changes or visible lesions. No palpable masses, nipple discharge or adenopathy bilaterally.  ABDOMEN: Soft. No tenderness or masses. No hernias. No CVA tenderness.  VULVA: No  lesions. Normal female genitalia.  URETHRAL MEATUS: Normal size and location, no lesions, no prolapse.  URETHRA: No masses, tenderness, prolapse or scarring.  VAGINA: Moist and well rugated, no abnormal discharge, no significant cystocele or rectocele.  CERVIX: No lesions and discharge.   UTERUS: Normal size, regular shape, mobile, non-tender, bladder base nontender.  ADNEXA: No masses, tenderness or CDS nodularity.  ANUS PERINEUM: Normal.      Diagnosis:  1. Women's annual routine gynecological examination    2. Irregular menses          PLAN:    Orders Placed This Encounter    POCT urine pregnancy       Patient was counseled today on the need for annual gyn exams.  We discussed her recent episode of prolonged bleeding and the various etiologies.  UPT today was negative.  She will monitor her menses for now.  For any additional abnormal bleeding, we discussed the need for evaluation.  She still wants to conceive and does not want hormonal regulation of her menses.    Follow-up in 1 year.

## 2021-04-28 ENCOUNTER — PATIENT MESSAGE (OUTPATIENT)
Dept: RESEARCH | Facility: HOSPITAL | Age: 41
End: 2021-04-28

## 2021-05-19 ENCOUNTER — TELEPHONE (OUTPATIENT)
Dept: ADMINISTRATIVE | Facility: OTHER | Age: 41
End: 2021-05-19

## 2021-05-21 ENCOUNTER — OCCUPATIONAL HEALTH (OUTPATIENT)
Dept: URGENT CARE | Facility: CLINIC | Age: 41
End: 2021-05-21
Payer: COMMERCIAL

## 2021-05-21 DIAGNOSIS — Z02.83 ENCOUNTER FOR DRUG SCREENING: Primary | ICD-10-CM

## 2021-05-21 LAB
CTP QC/QA: YES
POC 10 PANEL DRUG SCREEN: NEGATIVE

## 2021-05-21 PROCEDURE — 80305 DRUG TEST PRSMV DIR OPT OBS: CPT | Mod: S$GLB,,, | Performed by: FAMILY MEDICINE

## 2021-05-21 PROCEDURE — 80305 POCT RAPID DRUG SCREEN 10 PANEL: ICD-10-PCS | Mod: S$GLB,,, | Performed by: FAMILY MEDICINE

## 2021-06-28 ENCOUNTER — PATIENT MESSAGE (OUTPATIENT)
Dept: FAMILY MEDICINE | Facility: CLINIC | Age: 41
End: 2021-06-28

## 2021-06-29 ENCOUNTER — PATIENT MESSAGE (OUTPATIENT)
Dept: FAMILY MEDICINE | Facility: CLINIC | Age: 41
End: 2021-06-29

## 2021-06-29 DIAGNOSIS — L85.3 XEROSIS CUTIS: ICD-10-CM

## 2021-06-29 RX ORDER — FLUOCINONIDE 0.5 MG/G
CREAM TOPICAL 2 TIMES DAILY
Qty: 30 G | Refills: 1 | Status: SHIPPED | OUTPATIENT
Start: 2021-06-29 | End: 2021-07-13

## 2023-02-07 ENCOUNTER — OCCUPATIONAL HEALTH (OUTPATIENT)
Dept: URGENT CARE | Facility: CLINIC | Age: 43
End: 2023-02-07

## 2023-02-07 PROCEDURE — 80305 PR COLLECTION ONLY DRUG SCREEN: ICD-10-PCS | Mod: S$GLB,,,

## 2023-02-07 PROCEDURE — 80305 DRUG TEST PRSMV DIR OPT OBS: CPT | Mod: S$GLB,,,

## 2023-02-09 ENCOUNTER — OCCUPATIONAL HEALTH (OUTPATIENT)
Dept: URGENT CARE | Facility: CLINIC | Age: 43
End: 2023-02-09

## 2023-02-09 PROCEDURE — 80305 DRUG TEST PRSMV DIR OPT OBS: CPT | Mod: S$GLB,,, | Performed by: EMERGENCY MEDICINE

## 2023-02-09 PROCEDURE — 80305 PR COLLECTION ONLY DRUG SCREEN: ICD-10-PCS | Mod: S$GLB,,, | Performed by: EMERGENCY MEDICINE
